# Patient Record
Sex: MALE | Race: WHITE | NOT HISPANIC OR LATINO | Employment: OTHER | URBAN - METROPOLITAN AREA
[De-identification: names, ages, dates, MRNs, and addresses within clinical notes are randomized per-mention and may not be internally consistent; named-entity substitution may affect disease eponyms.]

---

## 2017-07-07 ENCOUNTER — RESOLUTE PROFESSIONAL BILLING HOSPITAL PROF FEE (OUTPATIENT)
Dept: HOSPITALIST | Facility: MEDICAL CENTER | Age: 62
End: 2017-07-07
Payer: MEDICARE

## 2017-07-07 ENCOUNTER — APPOINTMENT (OUTPATIENT)
Dept: RADIOLOGY | Facility: MEDICAL CENTER | Age: 62
End: 2017-07-07
Attending: EMERGENCY MEDICINE
Payer: MEDICARE

## 2017-07-07 ENCOUNTER — APPOINTMENT (OUTPATIENT)
Dept: RADIOLOGY | Facility: MEDICAL CENTER | Age: 62
End: 2017-07-07
Attending: INTERNAL MEDICINE
Payer: MEDICARE

## 2017-07-07 ENCOUNTER — HOSPITAL ENCOUNTER (OUTPATIENT)
Facility: MEDICAL CENTER | Age: 62
End: 2017-07-08
Attending: EMERGENCY MEDICINE | Admitting: INTERNAL MEDICINE
Payer: MEDICARE

## 2017-07-07 DIAGNOSIS — R07.9 CHEST PAIN, UNSPECIFIED TYPE: ICD-10-CM

## 2017-07-07 DIAGNOSIS — J44.1 ACUTE EXACERBATION OF CHRONIC OBSTRUCTIVE PULMONARY DISEASE (COPD) (HCC): ICD-10-CM

## 2017-07-07 LAB
ALBUMIN SERPL BCP-MCNC: 4.1 G/DL (ref 3.2–4.9)
ALBUMIN/GLOB SERPL: 1.2 G/DL
ALP SERPL-CCNC: 103 U/L (ref 30–99)
ALT SERPL-CCNC: 9 U/L (ref 2–50)
ANION GAP SERPL CALC-SCNC: 8 MMOL/L (ref 0–11.9)
AST SERPL-CCNC: 15 U/L (ref 12–45)
BASOPHILS # BLD AUTO: 1.2 % (ref 0–1.8)
BASOPHILS # BLD: 0.11 K/UL (ref 0–0.12)
BILIRUB SERPL-MCNC: 0.6 MG/DL (ref 0.1–1.5)
BUN SERPL-MCNC: 21 MG/DL (ref 8–22)
CALCIUM SERPL-MCNC: 9.5 MG/DL (ref 8.5–10.5)
CHLORIDE SERPL-SCNC: 104 MMOL/L (ref 96–112)
CO2 SERPL-SCNC: 24 MMOL/L (ref 20–33)
CREAT SERPL-MCNC: 1 MG/DL (ref 0.5–1.4)
DEPRECATED D DIMER PPP IA-ACNC: 297 NG/ML(D-DU)
EKG IMPRESSION: NORMAL
EOSINOPHIL # BLD AUTO: 0.29 K/UL (ref 0–0.51)
EOSINOPHIL NFR BLD: 3.2 % (ref 0–6.9)
ERYTHROCYTE [DISTWIDTH] IN BLOOD BY AUTOMATED COUNT: 42.2 FL (ref 35.9–50)
GFR SERPL CREATININE-BSD FRML MDRD: >60 ML/MIN/1.73 M 2
GLOBULIN SER CALC-MCNC: 3.4 G/DL (ref 1.9–3.5)
GLUCOSE SERPL-MCNC: 91 MG/DL (ref 65–99)
HCT VFR BLD AUTO: 48 % (ref 42–52)
HGB BLD-MCNC: 16.4 G/DL (ref 14–18)
IMM GRANULOCYTES # BLD AUTO: 0.02 K/UL (ref 0–0.11)
IMM GRANULOCYTES NFR BLD AUTO: 0.2 % (ref 0–0.9)
LYMPHOCYTES # BLD AUTO: 2.55 K/UL (ref 1–4.8)
LYMPHOCYTES NFR BLD: 28.5 % (ref 22–41)
MCH RBC QN AUTO: 30.2 PG (ref 27–33)
MCHC RBC AUTO-ENTMCNC: 34.2 G/DL (ref 33.7–35.3)
MCV RBC AUTO: 88.4 FL (ref 81.4–97.8)
MONOCYTES # BLD AUTO: 0.69 K/UL (ref 0–0.85)
MONOCYTES NFR BLD AUTO: 7.7 % (ref 0–13.4)
NEUTROPHILS # BLD AUTO: 5.29 K/UL (ref 1.82–7.42)
NEUTROPHILS NFR BLD: 59.2 % (ref 44–72)
NRBC # BLD AUTO: 0 K/UL
NRBC BLD AUTO-RTO: 0 /100 WBC
PLATELET # BLD AUTO: 246 K/UL (ref 164–446)
PMV BLD AUTO: 10.2 FL (ref 9–12.9)
POTASSIUM SERPL-SCNC: 3.8 MMOL/L (ref 3.6–5.5)
PROT SERPL-MCNC: 7.5 G/DL (ref 6–8.2)
RBC # BLD AUTO: 5.43 M/UL (ref 4.7–6.1)
SODIUM SERPL-SCNC: 136 MMOL/L (ref 135–145)
TROPONIN I SERPL-MCNC: <0.01 NG/ML (ref 0–0.04)
WBC # BLD AUTO: 9 K/UL (ref 4.8–10.8)

## 2017-07-07 PROCEDURE — 71275 CT ANGIOGRAPHY CHEST: CPT

## 2017-07-07 PROCEDURE — 85379 FIBRIN DEGRADATION QUANT: CPT

## 2017-07-07 PROCEDURE — 80053 COMPREHEN METABOLIC PANEL: CPT

## 2017-07-07 PROCEDURE — 93005 ELECTROCARDIOGRAM TRACING: CPT

## 2017-07-07 PROCEDURE — 85025 COMPLETE CBC W/AUTO DIFF WBC: CPT

## 2017-07-07 PROCEDURE — A9270 NON-COVERED ITEM OR SERVICE: HCPCS | Performed by: INTERNAL MEDICINE

## 2017-07-07 PROCEDURE — 36415 COLL VENOUS BLD VENIPUNCTURE: CPT

## 2017-07-07 PROCEDURE — G0378 HOSPITAL OBSERVATION PER HR: HCPCS

## 2017-07-07 PROCEDURE — 93010 ELECTROCARDIOGRAM REPORT: CPT | Mod: 76 | Performed by: INTERNAL MEDICINE

## 2017-07-07 PROCEDURE — 99406 BEHAV CHNG SMOKING 3-10 MIN: CPT | Performed by: INTERNAL MEDICINE

## 2017-07-07 PROCEDURE — 99285 EMERGENCY DEPT VISIT HI MDM: CPT

## 2017-07-07 PROCEDURE — 700102 HCHG RX REV CODE 250 W/ 637 OVERRIDE(OP): Performed by: INTERNAL MEDICINE

## 2017-07-07 PROCEDURE — 304562 HCHG STAT O2 MASK/CANNULA

## 2017-07-07 PROCEDURE — 99220 PR INITIAL OBSERVATION CARE,LEVL III: CPT | Mod: 25 | Performed by: INTERNAL MEDICINE

## 2017-07-07 PROCEDURE — 71010 DX-CHEST-PORTABLE (1 VIEW): CPT

## 2017-07-07 PROCEDURE — 84484 ASSAY OF TROPONIN QUANT: CPT

## 2017-07-07 PROCEDURE — 93005 ELECTROCARDIOGRAM TRACING: CPT | Performed by: INTERNAL MEDICINE

## 2017-07-07 PROCEDURE — 700101 HCHG RX REV CODE 250: Performed by: EMERGENCY MEDICINE

## 2017-07-07 PROCEDURE — 94640 AIRWAY INHALATION TREATMENT: CPT

## 2017-07-07 RX ORDER — HYDROCODONE BITARTRATE AND ACETAMINOPHEN 7.5; 325 MG/1; MG/1
1 TABLET ORAL EVERY 6 HOURS PRN
COMMUNITY
End: 2019-03-03

## 2017-07-07 RX ORDER — LEVOFLOXACIN 750 MG/1
750 TABLET, FILM COATED ORAL DAILY
Status: DISCONTINUED | OUTPATIENT
Start: 2017-07-07 | End: 2017-07-08 | Stop reason: HOSPADM

## 2017-07-07 RX ORDER — ASPIRIN 325 MG
325 TABLET ORAL ONCE
COMMUNITY
End: 2019-03-03

## 2017-07-07 RX ORDER — AMOXICILLIN 250 MG
2 CAPSULE ORAL 2 TIMES DAILY
Status: DISCONTINUED | OUTPATIENT
Start: 2017-07-07 | End: 2017-07-08 | Stop reason: HOSPADM

## 2017-07-07 RX ORDER — POLYETHYLENE GLYCOL 3350 17 G/17G
1 POWDER, FOR SOLUTION ORAL
Status: DISCONTINUED | OUTPATIENT
Start: 2017-07-07 | End: 2017-07-08 | Stop reason: HOSPADM

## 2017-07-07 RX ORDER — BUDESONIDE 0.5 MG/2ML
0.5 INHALANT ORAL
Status: DISCONTINUED | OUTPATIENT
Start: 2017-07-07 | End: 2017-07-08 | Stop reason: HOSPADM

## 2017-07-07 RX ORDER — BISACODYL 10 MG
10 SUPPOSITORY, RECTAL RECTAL
Status: DISCONTINUED | OUTPATIENT
Start: 2017-07-07 | End: 2017-07-08 | Stop reason: HOSPADM

## 2017-07-07 RX ORDER — IPRATROPIUM BROMIDE AND ALBUTEROL SULFATE 2.5; .5 MG/3ML; MG/3ML
3 SOLUTION RESPIRATORY (INHALATION)
Status: DISCONTINUED | OUTPATIENT
Start: 2017-07-07 | End: 2017-07-08 | Stop reason: HOSPADM

## 2017-07-07 RX ORDER — BUDESONIDE 0.5 MG/2ML
0.5 INHALANT ORAL
Status: DISCONTINUED | OUTPATIENT
Start: 2017-07-07 | End: 2017-07-07

## 2017-07-07 RX ORDER — IBUPROFEN 800 MG/1
800 TABLET ORAL EVERY 8 HOURS PRN
COMMUNITY

## 2017-07-07 RX ADMIN — LEVOFLOXACIN 750 MG: 750 TABLET, FILM COATED ORAL at 14:59

## 2017-07-07 RX ADMIN — IPRATROPIUM BROMIDE AND ALBUTEROL SULFATE 3 ML: .5; 3 SOLUTION RESPIRATORY (INHALATION) at 13:41

## 2017-07-07 ASSESSMENT — LIFESTYLE VARIABLES
EVER_SMOKED: YES
EVER_SMOKED: YES
ALCOHOL_USE: NO
DO YOU DRINK ALCOHOL: NO

## 2017-07-07 ASSESSMENT — ENCOUNTER SYMPTOMS
ABDOMINAL PAIN: 0
DIAPHORESIS: 1
NAUSEA: 0
VOMITING: 0
DIARRHEA: 0

## 2017-07-07 ASSESSMENT — PAIN SCALES - GENERAL
PAINLEVEL_OUTOF10: 0
PAINLEVEL_OUTOF10: 5
PAINLEVEL_OUTOF10: 0
PAINLEVEL_OUTOF10: 0

## 2017-07-07 ASSESSMENT — COPD QUESTIONNAIRES
DURING THE PAST 4 WEEKS HOW MUCH DID YOU FEEL SHORT OF BREATH: SOME OF THE TIME
HAVE YOU SMOKED AT LEAST 100 CIGARETTES IN YOUR ENTIRE LIFE: YES
COPD SCREENING SCORE: 6
DO YOU EVER COUGH UP ANY MUCUS OR PHLEGM?: YES, A FEW DAYS A WEEK OR MONTH

## 2017-07-07 NOTE — ED PROVIDER NOTES
ED Provider Note    Scribed for Buzz Smyth M.D. by Jose Juan Bueno. 7/7/2017, 1:24 PM.    Primary care provider: Pcp Pt States None  Means of arrival: walk-in  History obtained from: patient  History limited by: none    CHIEF COMPLAINT  Chief Complaint   Patient presents with   • Chest Pain     left sided, radiates thru to left shoulder blade, onset yesterday, intermittent, now worse and more frequent since 3am       HPI  Neeta Warren is a 61 y.o. male smoker who presents to the Emergency Department complaining of chest pain, located to the left side of his chest, which radiates through to the middle of his shoulder blades, onset 10-11 hours ago. Patient adds he felt some small pains yesterday which he thought was indegestion. He has not had an incident like this before. He confirms sweating as he was outside and hot. Denies nausea, vomiting, diarrhea, melnea abdominal pain, bilateral leg pain or swelling. He is on pain management medication secondary to having 17 surgeries on his wrist, having injured it while he was climbing a ladder, when his tendons popped, giving out, causing him to fall. His bilateral arm pain in constant and baseline today. Patient is allergic to NSAIDS.     History: Patient presents with left sided chest pain. He is a current everyday smoker.     REVIEW OF SYSTEMS  Review of Systems   Constitutional: Positive for diaphoresis.   Cardiovascular: Positive for chest pain.   Gastrointestinal: Negative for nausea, vomiting, abdominal pain, diarrhea and melena.   Musculoskeletal:        Negative bilateral leg pain and leg swelling.      All other systems reviewed and are negative.  C.     PAST MEDICAL HISTORY   has a past medical history of Heart murmur and Kidney disease.    SURGICAL HISTORY   has past surgical history that includes hand surgery.    SOCIAL HISTORY  Social History   Substance Use Topics   • Smoking status: Current Every Day Smoker -- 1.00 packs/day     Types: Cigarettes   •  "Smokeless tobacco: Never Used   • Alcohol Use: No      History   Drug Use No       FAMILY HISTORY  Family History   Problem Relation Age of Onset   • Diabetes Father    • Heart Disease Maternal Grandmother    • Heart Disease Maternal Grandfather        CURRENT MEDICATIONS  Home Medications     **Home medications have not yet been reviewed for this encounter**          ALLERGIES  Allergies   Allergen Reactions   • Bee    • Celebrex [Celecoxib]    • Cortisone    NSAIDS    PHYSICAL EXAM  VITAL SIGNS: /96 mmHg  Pulse 99  Temp(Src) 36.7 °C (98.1 °F)  Resp 16  Ht 1.676 m (5' 6\")  Wt 92 kg (202 lb 13.2 oz)  BMI 32.75 kg/m2  SpO2 95%    Constitutional:   No acute distress  HENT:  Moist mucous membranes  Eyes:  No conjunctivitis or icterus  Neck:  trachea is midline, no palpable thyroid  Lymphatic:  No cervical lymphadenopathy  Cardiovascular:  Tachycardic. Regular rhythm, no murmurs  Thorax & Lungs: Wheezes throughout. no rhonchi  Abdomen:  Soft, Non-tender  Skin:.  no rash  Back:  Non-tender, no CVA tenderness  Extremities:   no edema  Vascular:  symmetric radial pulse  Neurologic:  Normal gross motor    LABS  Labs Reviewed   COMP METABOLIC PANEL - Abnormal; Notable for the following:     Alkaline Phosphatase 103 (*)     All other components within normal limits   D-DIMER - Abnormal; Notable for the following:     D-Dimer Screen 297 (*)     All other components within normal limits   CBC WITH DIFFERENTIAL   TROPONIN   ESTIMATED GFR     All labs reviewed by me.    EKG Interpretation  Interpreted by me  Normal Sinus Rhythm. Rate 90   with normal corrected QT intervals.   QRS left axis  Otherwise normal. Unchanged.     RADIOLOGY  DX-CHEST-PORTABLE (1 VIEW)   Final Result      1.  There is a small left pleural effusion versus pleural thickening.   2.  There is left lower lobe atelectasis.        The radiologist's interpretation of all radiological studies have been reviewed by me.    COURSE & MEDICAL DECISION " MAKING  Pertinent Labs & Imaging studies reviewed. (See chart for details)    1:24 PM - Patient seen and examined at bedside. Patient will be treated with Duoneb. Ordered DX chest, CBC with differential, CMP, Troponin, EKG to evaluate his symptoms. The differential diagnoses include but are not limited to: Chest pain rule out myocardial infarction. Will perform D-dimer for screening.  Informed patient he may have emphysema secondary to his smoking history.     2:20 PM - I discussed the patient's case and the above findings with Dr. Thomas (Hospitalist) who agrees to admit the patient.           DISPOSITION:  Patient will be admitted to Dr. Thomas in guarded condition.      FINAL IMPRESSION  1. Chest pain, unspecified type    2. Acute exacerbation of chronic obstructive pulmonary disease (COPD) (CMS-[facility])          Jose Juan CARTER (Scribe), am scribing for, and in the presence of, Buzz Smyth M.D..    Electronically signed by: Jose Juan Bueno (Scribe), 7/7/2017    Buzz CARTER M.D. personally performed the services described in this documentation, as scribed by Jose Juan Bueno in my presence, and it is both accurate and complete.    The note accurately reflects work and decisions made by me.  Buzz Smyth  7/7/2017  5:59 PM

## 2017-07-07 NOTE — H&P
HOSPITAL MEDICINE ADMISSION NOTE    Date of Service: 2017   Name: Neeta Warren   : 1955  MRN: [id number]  CSN: [id number]  Primary Care Physician: Pcp Pt States None    Chief Complaint  Chief Complaint   Patient presents with   • Chest Pain     left sided, radiates thru to left shoulder blade, onset yesterday, intermittent, now worse and more frequent since 3am       History of Present Illness  Neeta Warren is a 61 y.o. male who complained of chest pain. He described it on L side rad to sternum then to R, woke him up at 3am, 6-7/10 intensity, sharp, comes and goes. Not pleuritic or reproducible. No alleviating or aggravating factors. Risk factors include smoking. No yuki cardiac disease though his mom had heart attack at an elderly age. He has a cortisone allergy. He has no cardiac history. He has no formal diagnosis of COPD.  At the ED, he is afebrile, hemodynamically stable. EKG sinus, trop x 1 negative. CXR small L effusion atelectasis.   When I saw him at ED he was in no acute distress. He was wheezing but moving air well bilaterally. No edema.    Home Medications  No current facility-administered medications on file prior to encounter.     Current Outpatient Prescriptions on File Prior to Encounter   Medication Sig Dispense Refill   • hydrocodone-acetaminophen (LORTAB 7.5) 7.5-500 MG TABS Take 1 Tab by mouth every 6 hours as needed.         Allergies  Bee; Celebrex; and Cortisone    Past Medical and Surgical History  Past Medical History   Diagnosis Date   • Heart murmur    • Kidney disease      Past Surgical History   Procedure Laterality Date   • Hand surgery         Family History  Family History   Problem Relation Age of Onset   • Diabetes Father    • Heart Disease Maternal Grandmother    • Heart Disease Maternal Grandfather        Social History  Social History     Social History   • Marital Status:      Spouse Name: N/A   • Number of Children: N/A   • Years of Education: N/A  "    Occupational History   • Not on file.     Social History Main Topics   • Smoking status: Current Every Day Smoker -- 1.00 packs/day     Types: Cigarettes   • Smokeless tobacco: Never Used   • Alcohol Use: No   • Drug Use: No   • Sexual Activity: Not on file     Other Topics Concern   • Not on file     Social History Narrative   • No narrative on file       Review of Systems  ROS    General. No fevers or chills.  Eyes: No vision loss.  ENT: No nasal congestion, epistaxis. No hearing loss.  Respiratory: No shortness of breath, productive coughing, wheezing. No hemoptysis.  Cardiovascular: Chest pain. No palpitations, murmur or claudications. No orthopnea, exertional chest pain. No dyspnea on exertion.  Gastrointestinal: No nausea, vomiting, diarrhea, constipation. No abdominal pain.  Genitourinary: No dysuria. No incontinence.  Musoskeletal: No falls, myalgias or arthralgias.  Integumentary: No new rashes  Heme: No obvious lymphadenopathy  Neurologic: No headaches, loss of consciousness or seizure activity.  Psychiatric: Stable mood. Not currently anxious or depressed.    Physical Exam  Filed Vitals:    07/07/17 1237 07/07/17 1248 07/07/17 1343   BP: 155/96     Pulse: 99  86   Temp: 36.7 °C (98.1 °F)     Resp: 16  16   Height: 1.676 m (5' 6\")     Weight:  92 kg (202 lb 13.2 oz)    SpO2: 95%  97%           No intake or output data in the 24 hours ending 07/07/17 1435  Physical Exam    General/Constitutional: No acute distress.   Head: Normocephalic, atraumatic  ENT: Oral mucosa is moist. No obvious pharyngeal exudates  Eyes: Pink conjunctiva, no scleral icterus  Neck: Supple, no lymphadenopathy  Cardiovascular: Normal rate and regular rhythm. S1,2 noted. No murmurs, gallops or rubs.  Pulmonary: Wheezing but moving air well bilaterally.  Abdominal: Soft, nontender, not distended, bowel sounds normoactive. No guarding or peritoneal signs.  Musculoskeletal: No tenderness to palpation of chest wall.  Neurologic: Alert " and oriented. Grossly nonfocal, moving all extremities.  Genitourinary: No gross hematuria  Skin: No obvious rash.  Psychiatric: Pleasant, cooperative.  Vitals Reviewed  Labs Reviewed  Imaging reviewed  Nursing notes reviewed    Labs  Lab Results   Component Value Date/Time    WBC 9.0 07/07/2017 01:30 PM    RBC 5.43 07/07/2017 01:30 PM    HEMOGLOBIN 16.4 07/07/2017 01:30 PM    HEMATOCRIT 48.0 07/07/2017 01:30 PM    MCV 88.4 07/07/2017 01:30 PM    MCH 30.2 07/07/2017 01:30 PM    MCHC 34.2 07/07/2017 01:30 PM    MPV 10.2 07/07/2017 01:30 PM    NEUTROPHILS-POLYS 59.20 07/07/2017 01:30 PM    LYMPHOCYTES 28.50 07/07/2017 01:30 PM    MONOCYTES 7.70 07/07/2017 01:30 PM    EOSINOPHILS 3.20 07/07/2017 01:30 PM    BASOPHILS 1.20 07/07/2017 01:30 PM      Lab Results   Component Value Date/Time    SODIUM 136 07/07/2017 01:30 PM    POTASSIUM 3.8 07/07/2017 01:30 PM    CHLORIDE 104 07/07/2017 01:30 PM    CO2 24 07/07/2017 01:30 PM    GLUCOSE 91 07/07/2017 01:30 PM    BUN 21 07/07/2017 01:30 PM    CREATININE 1.00 07/07/2017 01:30 PM      No results found for: PROTHROMBTM, INR     Imaging  Dx-chest-portable (1 View)    7/7/2017 7/7/2017 2:00 PM HISTORY/REASON FOR EXAM:  Left-sided chest pain for one day after breathing treatment today. TECHNIQUE/EXAM DESCRIPTION AND NUMBER OF VIEWS: Single portable view of the chest. COMPARISON: 1/19/2012 FINDINGS: The mediastinal and cardiac silhouette is unremarkable. The pulmonary vascularity is within normal limits. The lung parenchyma demonstrates minimal hazy left lower lobe opacity. There is blunting of the left costophrenic angle. There is no visible pneumothorax. There is a scoliosis.     7/7/2017  1.  There is a small left pleural effusion versus pleural thickening. 2.  There is left lower lobe atelectasis.      Assessment and Plan    Chest pain. Put on telemtry, trend troponins, ASA, lipid panel, echo. Ddimer pending. Do CTA PE if elevated.    COPD exacerbation. No formal diagnosis. Has  cortisone allergy. Will do breathing treatments, O2 and resp per protocol. In the outpatient, can benefit from sleep study and PFTs.    Tobacco dependence. I counseled him on smoking and effects on worsening COPD. I spent 7 minutes. He is tryin to quit.    Pharmacologic DVT prophylaxis.    I spent 72 minutes evaluating Neeta Warren, reviewing the chart, vitals, labs and imaging, discussing the case with ED physician, medication reconciliation placing orders and enacting the plan above.    CC Pcp Pt States None

## 2017-07-07 NOTE — IP AVS SNAPSHOT
" Home Care Instructions                                                                                                                  Name:Neeta Warren  Medical Record Number:[id number]  CSN: [id number]    YOB: 1955   Age: 61 y.o.  Sex: male  HT:1.676 m (5' 6\") WT: 92.5 kg (203 lb 14.8 oz)          Admit Date: 7/7/2017     Discharge Date:   Today's Date: 7/8/2017  Attending Doctor:  Farhan Mcginnis M.D.                  Allergies:  Bee; Celebrex; and Cortisone            Discharge Instructions       Discharge Instructions    Discharged to home by car with relative. Discharged via wheelchair, hospital escort: Yes.  Special equipment needed: Not Applicable    Be sure to schedule a follow-up appointment with your primary care doctor or any specialists as instructed.     Discharge Plan:   Diet Plan: Discussed  Activity Level: Discussed  Smoking Cessation Offered: Patient Refused  Confirmed Follow up Appointment: Patient to Call and Schedule Appointment  Confirmed Symptoms Management: Discussed  Medication Reconciliation Updated: Yes  Influenza Vaccine Indication: Patient Refuses    I understand that a diet low in cholesterol, fat, and sodium is recommended for good health. Unless I have been given specific instructions below for another diet, I accept this instruction as my diet prescription.   Other diet: Cardiac    Special Instructions: None    · Is patient discharged on Warfarin / Coumadin?   No     · Is patient Post Blood Transfusion?  No    Depression / Suicide Risk    As you are discharged from [facility], it is important to learn how to keep safe from harming yourself.    Recognize the warning signs:  · Abrupt changes in personality, positive or negative- including increase in energy   · Giving away possessions  · Change in eating patterns- significant weight changes-  positive or negative  · Change in sleeping patterns- unable to sleep or sleeping all the time   · Unwillingness or " inability to communicate  · Depression  · Unusual sadness, discouragement and loneliness  · Talk of wanting to die  · Neglect of personal appearance   · Rebelliousness- reckless behavior  · Withdrawal from people/activities they love  · Confusion- inability to concentrate     If you or a loved one observes any of these behaviors or has concerns about self-harm, here's what you can do:  · Talk about it- your feelings and reasons for harming yourself  · Remove any means that you might use to hurt yourself (examples: pills, rope, extension cords, firearm)  · Get professional help from the community (Mental Health, Substance Abuse, psychological counseling)  · Do not be alone:Call your Safe Contact- someone whom you trust who will be there for you.  · Call your local CRISIS HOTLINE 154-5677 or 203-110-2531  · Call your local Children's Mobile Crisis Response Team [place] (268) 936-2223 or www.[employer]  · Call the toll free National Suicide Prevention Hotlines   · National Suicide Prevention Lifeline 952-425-DLDS (9071)  · [employer] Hope Line Network 800-SUICIDE (133-8435)        Your appointments     Jul 18, 2017  8:40 AM   New Patient with Rusty Becker M.D.   [facility]    [address]  [address]  [place] [address] [phone]           Please bring Photo ID, Insurance Cards, All Medication Bottles and copies of any legal documents (such as Living Will, Power of ) If speaking a language besides English please bring an adult . Please arrive 30 minutes prior for check in and registration. You will be receiving a confirmation call a few days before your appointment from our automated call confirmation system.                 Discharge Medication Instructions:    Below are the medications your physician expects you to take upon discharge:    Review all your home medications and newly ordered medications with your doctor and/or  pharmacist. Follow medication instructions as directed by your doctor and/or pharmacist.    Please keep your medication list with you and share with your physician.               Medication List      START taking these medications        Instructions    Morning Afternoon Evening Bedtime    albuterol 108 (90 BASE) MCG/ACT Aers inhalation aerosol        Inhale 2 Puffs by mouth every 6 hours as needed for Shortness of Breath.   Dose:  2 Puff                        levofloxacin 750 MG tablet   Last time this was given:  750 mg on 7/8/2017  7:47 AM   Commonly known as:  LEVAQUIN        Take 1 Tab by mouth every day for 5 days.   Dose:  750 mg                          CONTINUE taking these medications        Instructions    Morning Afternoon Evening Bedtime    aspirin 325 MG Tabs   Commonly known as:  ASA        Take 650 mg by mouth Once.   Dose:  650 mg                        hydrocodone-acetaminophen 7.5-325 MG per tablet   Commonly known as:  NORCO        Take 1 Tab by mouth every 6 hours as needed.   Dose:  1 Tab                        ibuprofen 800 MG Tabs   Commonly known as:  MOTRIN        Take 800 mg by mouth every 8 hours as needed.   Dose:  800 mg                             Where to Get Your Medications      These medications were sent to [facility] [address] [place] [address] [place] - [address] [place], [place] [address]Y  [address] [facility] [address]     Phone:  [phone]    - albuterol 108 (90 BASE) MCG/ACT Aers inhalation aerosol  - levofloxacin 750 MG tablet            Instructions           Diet / Nutrition:    Follow any diet instructions given to you by your doctor or the dietician, including how much salt (sodium) you are allowed each day.    If you are overweight, talk to your doctor about a weight reduction plan.    Activity:    Remain physically active following your doctor's instructions about exercise and activity.    Rest often.     Any time you become even a little tired or short of breath, SIT DOWN and  rest.    Worsening Symptoms:    Report any of the following signs and symptoms to the doctor's office immediately:    *Pain of jaw, arm, or neck  *Chest pain not relieved by medication                               *Dizziness or loss of consciousness  *Difficulty breathing even when at rest   *More tired than usual                                       *Bleeding drainage or swelling of surgical site  *Swelling of feet, ankles, legs or stomach                 *Fever (>100ºF)  *Pink or blood tinged sputum  *Weight gain (3lbs/day or 5lbs /week)           *Shock from internal defibrillator (if applicable)  *Palpitations or irregular heartbeats                *Cool and/or numb extremities    Stroke Awareness    Common Risk Factors for Stroke include:    Age  Atrial Fibrillation  Carotid Artery Stenosis  Diabetes Mellitus  Excessive alcohol consumption  High blood pressure  Overweight   Physical inactivity  Smoking    Warning signs and symptoms of a stroke include:    *Sudden numbness or weakness of the face, arm or leg (especially on one side of the body).  *Sudden confusion, trouble speaking or understanding.  *Sudden trouble seeing in one or both eyes.  *Sudden trouble walking, dizziness, loss of balance or coordination.Sudden severe headache with no known cause.    It is very important to get treatment quickly when a stroke occurs. If you experience any of the above warning signs, call 911 immediately.                   Disclaimer         Quit Smoking / Tobacco Use:    I understand the use of any tobacco products increases my chance of suffering from future heart disease or stroke and could cause other illnesses which may shorten my life. Quitting the use of tobacco products is the single most important thing I can do to improve my health. For further information on smoking / tobacco cessation call a Toll Free Quit Line at 1-891.955.9769 (*National Cancer [place]) or 1-138.405.1289 (American Lung Association) or you  can access the web based program at www.lungusa.org.    [place] Tobacco Users Help Line:  (851) 783-2234       Toll Free: 1-706.444.2375  Quit Tobacco Program [facility] (890)829-8913    Crisis Hotline:    [place] Crisis Hotline:  4-224-BSOWPSX or 1-738.498.3014    [place] Crisis Hotline:    1-236.380.5102 or 494-205-8148    Discharge Survey:   Thank you for choosing [facility]. We hope we did everything we could to make your hospital stay a pleasant one. You may be receiving a phone survey and we would appreciate your time and participation in answering the questions. Your input is very valuable to us in our efforts to improve our service to our patients and their families.        My signature on this form indicates that:    1. I have reviewed and understand the above information.  2. My questions regarding this information have been answered to my satisfaction.  3. I have formulated a plan with my discharge nurse to obtain my prescribed medications for home.                  Disclaimer         __________________________________                     __________       ________                       Patient Signature                                                 Date                    Time

## 2017-07-07 NOTE — ED NOTES
Pt amb to triage.  Chief Complaint   Patient presents with   • Chest Pain     left sided, radiates thru to left shoulder blade, onset yesterday, intermittent, now worse and more frequent since 3am     EKG complete.  PTA pt took ASA 650mg PO.

## 2017-07-08 ENCOUNTER — PATIENT OUTREACH (OUTPATIENT)
Dept: HEALTH INFORMATION MANAGEMENT | Facility: OTHER | Age: 62
End: 2017-07-08

## 2017-07-08 VITALS
WEIGHT: 203.93 LBS | TEMPERATURE: 96.2 F | RESPIRATION RATE: 18 BRPM | HEART RATE: 107 BPM | OXYGEN SATURATION: 91 % | HEIGHT: 66 IN | BODY MASS INDEX: 32.77 KG/M2 | DIASTOLIC BLOOD PRESSURE: 85 MMHG | SYSTOLIC BLOOD PRESSURE: 129 MMHG

## 2017-07-08 PROBLEM — F17.200 TOBACCO DEPENDENCE: Chronic | Status: ACTIVE | Noted: 2017-07-08

## 2017-07-08 LAB
LV EJECT FRACT  99904: 60
LV EJECT FRACT MOD 2C 99903: 57.2
LV EJECT FRACT MOD 4C 99902: 48.42
LV EJECT FRACT MOD BP 99901: 53.7

## 2017-07-08 PROCEDURE — A9270 NON-COVERED ITEM OR SERVICE: HCPCS | Performed by: INTERNAL MEDICINE

## 2017-07-08 PROCEDURE — 700111 HCHG RX REV CODE 636 W/ 250 OVERRIDE (IP): Performed by: INTERNAL MEDICINE

## 2017-07-08 PROCEDURE — 99217 PR OBSERVATION CARE DISCHARGE: CPT | Performed by: HOSPITALIST

## 2017-07-08 PROCEDURE — 93017 CV STRESS TEST TRACING ONLY: CPT

## 2017-07-08 PROCEDURE — 93018 CV STRESS TEST I&R ONLY: CPT | Performed by: INTERNAL MEDICINE

## 2017-07-08 PROCEDURE — 700102 HCHG RX REV CODE 250 W/ 637 OVERRIDE(OP): Performed by: INTERNAL MEDICINE

## 2017-07-08 PROCEDURE — 93350 STRESS TTE ONLY: CPT

## 2017-07-08 PROCEDURE — 93350 STRESS TTE ONLY: CPT | Mod: 26 | Performed by: INTERNAL MEDICINE

## 2017-07-08 PROCEDURE — G0378 HOSPITAL OBSERVATION PER HR: HCPCS

## 2017-07-08 RX ORDER — LEVOFLOXACIN 750 MG/1
750 TABLET, FILM COATED ORAL DAILY
Qty: 5 TAB | Refills: 0 | Status: SHIPPED | OUTPATIENT
Start: 2017-07-08 | End: 2017-07-13

## 2017-07-08 RX ORDER — ALBUTEROL SULFATE 90 UG/1
2 AEROSOL, METERED RESPIRATORY (INHALATION) EVERY 6 HOURS PRN
Qty: 8.5 G | Refills: 2 | Status: SHIPPED | OUTPATIENT
Start: 2017-07-08 | End: 2019-03-03

## 2017-07-08 RX ADMIN — LEVOFLOXACIN 750 MG: 750 TABLET, FILM COATED ORAL at 07:47

## 2017-07-08 ASSESSMENT — PAIN SCALES - GENERAL: PAINLEVEL_OUTOF10: 6

## 2017-07-08 ASSESSMENT — LIFESTYLE VARIABLES: EVER_SMOKED: YES

## 2017-07-08 NOTE — PROGRESS NOTES
Received report from SOLANGE Arita. Pt resting comfortably in bed w/ tele monitor in place. No needs identified at this time.

## 2017-07-08 NOTE — PROGRESS NOTES
Pt back from CT w/ transport. Pt placed back on tele. Per transport PIV blew when placing contrast.

## 2017-07-08 NOTE — PROGRESS NOTES
Report received, assumed patient care.  Pt A&OX4.  Assessment completed.  Call light within reach, personal belongings available, bed in lowest position, pt calling for assistance.  Pt reports no pain at this time (pain comes and goes quickly).  Communication board updated, POC discussed.  Monitors applied, VSS.  No additional needs at this time.

## 2017-07-08 NOTE — PROGRESS NOTES
Pt had 2 sips of Dr. Pepper and chocolate pudding that came w/ pt dinner. Pt states he has not had nay of that since 1920. Pt educated not to eat or drink anything else w/ caffeine such as soda, coffee, chocolate, or tea. Pt verbalizes understanding. Discussed case w/ SOLANGE Jaquez and MONIQUE MaynardG tech as well as look at inpatient imaging prep which states no caffeine for 12 hours prior to exam pt made aware. Nuc med to be notified at 0600 and day shift RN to notify MD in the morning.

## 2017-07-08 NOTE — CARE PLAN
Problem: Communication  Goal: The ability to communicate needs accurately and effectively will improve  Outcome: PROGRESSING AS EXPECTED  Pt educated on medications and tests scheduled for pt. Pt asks appropriate questions.    Problem: Safety  Goal: Will remain free from injury  Outcome: PROGRESSING AS EXPECTED  Pt has steady gait and verbalizes he will call if he needs help.

## 2017-07-08 NOTE — DISCHARGE INSTRUCTIONS
Discharge Instructions    Discharged to home by car with relative. Discharged via wheelchair, hospital escort: Yes.  Special equipment needed: Not Applicable    Be sure to schedule a follow-up appointment with your primary care doctor or any specialists as instructed.     Discharge Plan:   Diet Plan: Discussed  Activity Level: Discussed  Smoking Cessation Offered: Patient Refused  Confirmed Follow up Appointment: Patient to Call and Schedule Appointment  Confirmed Symptoms Management: Discussed  Medication Reconciliation Updated: Yes  Influenza Vaccine Indication: Patient Refuses    I understand that a diet low in cholesterol, fat, and sodium is recommended for good health. Unless I have been given specific instructions below for another diet, I accept this instruction as my diet prescription.   Other diet: Cardiac    Special Instructions: None    · Is patient discharged on Warfarin / Coumadin?   No     · Is patient Post Blood Transfusion?  No    Depression / Suicide Risk    As you are discharged from this [facility], it is important to learn how to keep safe from harming yourself.    Recognize the warning signs:  · Abrupt changes in personality, positive or negative- including increase in energy   · Giving away possessions  · Change in eating patterns- significant weight changes-  positive or negative  · Change in sleeping patterns- unable to sleep or sleeping all the time   · Unwillingness or inability to communicate  · Depression  · Unusual sadness, discouragement and loneliness  · Talk of wanting to die  · Neglect of personal appearance   · Rebelliousness- reckless behavior  · Withdrawal from people/activities they love  · Confusion- inability to concentrate     If you or a loved one observes any of these behaviors or has concerns about self-harm, here's what you can do:  · Talk about it- your feelings and reasons for harming yourself  · Remove any means that you might use to hurt yourself (examples:  pills, rope, extension cords, firearm)  · Get professional help from the community (Mental Health, Substance Abuse, psychological counseling)  · Do not be alone:Call your Safe Contact- someone whom you trust who will be there for you.  · Call your local CRISIS HOTLINE 453-9237 or 366-098-1180  · Call your local Children's Mobile Crisis Response Team [place] (006) 908-7518 or www.Spotted  · Call the toll free National Suicide Prevention Hotlines   · National Suicide Prevention Lifeline 287-431-EDHX (4184)  · National Hope Line Network 800-SUICIDE (094-9245)

## 2017-07-08 NOTE — PROGRESS NOTES
Assessment complete. A&Ox4. C/o pain 0/10 w/ stabbing pain per pt that lasts 10-15 sec q5 min. Will be medicated per MAR prn. Discussed POC w/ pt. Educated on medications per MAR. Discussed the importance of calling for needs and if pt has changes or increases in CP. Pt verbalizes understanding. Call light in reach. Bed in lowest position. No other needs identified. Will continue hourly rounding.

## 2017-07-08 NOTE — DISCHARGE SUMMARY
CHIEF COMPLAINT ON ADMISSION  Chief Complaint   Patient presents with   • Chest Pain     left sided, radiates thru to left shoulder blade, onset yesterday, intermittent, now worse and more frequent since 3am       CODE STATUS  Full Code    HPI & HOSPITAL COURSE  This is a 61 y.o. male here with chest pain. He described it on L side rad to sternum then to R, woke him up at 3am, 6-7/10 intensity, sharp, comes and goes. Not pleuritic or reproducible. No alleviating or aggravating factors. Risk factors include smoking and he was counseled on cessation >3min. No yuki cardiac disease though his mom had heart attack at an elderly age. He has a cortisone allergy. He has no cardiac history. He has no formal diagnosis of COPD. He is afebrile, hemodynamically stable. EKG sinus, troponins negative. CXR small L effusion and atelectasis. He was wheezing but moving air well bilaterally. He was saturating well on RA at discharge. He improved with Levaquin, RT nebulizer and a stress echo was done and negative. He will be given a new PCP to f/u with and we recommended getting pulmonary referral for Lung Function tests.     Therefore, he is discharged in good and stable condition with close outpatient follow-up.    SPECIFIC OUTPATIENT FOLLOW-UP  PCP within 7-10days    DISCHARGE PROBLEM LIST  Active Problems:    COPD exacerbation (CMS-[facility]) POA: Unknown    Chest pain POA: Unknown  Resolved Problems:    * No resolved hospital problems. *      FOLLOW UP  No future appointments.  No follow-up provider specified.    MEDICATIONS ON DISCHARGE   Neeta Warren   Home Medication Instructions JAISON:[id number]    Printed on:07/08/17 5293   Medication Information                      aspirin (ASA) 325 MG Tab  Take 650 mg by mouth Once.             hydrocodone-acetaminophen (NORCO) 7.5-325 MG per tablet  Take 1 Tab by mouth every 6 hours as needed.             ibuprofen (MOTRIN) 800 MG Tab  Take 800 mg by mouth every 8 hours as needed.                  DIET  Orders Placed This Encounter   Procedures   • Diet Order     Standing Status: Standing      Number of Occurrences: 1      Standing Expiration Date:      Order Specific Question:  Diet:     Answer:  Cardiac [6]       ACTIVITY  As tolerated.  Weight bearing as tolerated      CONSULTATIONS  None    PROCEDURES  None    LABORATORY  Lab Results   Component Value Date/Time    SODIUM 136 07/07/2017 01:30 PM    POTASSIUM 3.8 07/07/2017 01:30 PM    CHLORIDE 104 07/07/2017 01:30 PM    CO2 24 07/07/2017 01:30 PM    GLUCOSE 91 07/07/2017 01:30 PM    BUN 21 07/07/2017 01:30 PM    CREATININE 1.00 07/07/2017 01:30 PM        Lab Results   Component Value Date/Time    WBC 9.0 07/07/2017 01:30 PM    HEMOGLOBIN 16.4 07/07/2017 01:30 PM    HEMATOCRIT 48.0 07/07/2017 01:30 PM    PLATELET COUNT 246 07/07/2017 01:30 PM        Total time of the discharge process exceeds 36 minutes

## 2017-07-08 NOTE — PROGRESS NOTES
Discharge instructions, medications and follow-up reviewed with pt, pt verbalized understanding and denies questions. Discharge paperwork given to pt. PIV removed, TeleBox removed, armband removed. Pt awaiting transport.

## 2017-07-09 ENCOUNTER — PATIENT OUTREACH (OUTPATIENT)
Dept: HEALTH INFORMATION MANAGEMENT | Facility: OTHER | Age: 62
End: 2017-07-09

## 2017-07-09 LAB
EKG IMPRESSION: NORMAL
EKG IMPRESSION: NORMAL

## 2017-07-09 NOTE — PROGRESS NOTES
· Placed discharge outreach phone call to patient s/p hospital discharge 7/8/17.  Left voicemail providing my contact information and instructions to call with any questions or concerns.

## 2018-03-30 ENCOUNTER — OFFICE VISIT (OUTPATIENT)
Dept: URGENT CARE | Facility: CLINIC | Age: 63
End: 2018-03-30
Payer: MEDICARE

## 2018-03-30 VITALS
TEMPERATURE: 97.8 F | BODY MASS INDEX: 31.4 KG/M2 | WEIGHT: 212 LBS | OXYGEN SATURATION: 94 % | HEART RATE: 111 BPM | DIASTOLIC BLOOD PRESSURE: 64 MMHG | HEIGHT: 69 IN | SYSTOLIC BLOOD PRESSURE: 112 MMHG

## 2018-03-30 DIAGNOSIS — K04.7 DENTAL INFECTION: ICD-10-CM

## 2018-03-30 PROCEDURE — 99203 OFFICE O/P NEW LOW 30 MIN: CPT | Performed by: NURSE PRACTITIONER

## 2018-03-30 RX ORDER — CLINDAMYCIN HYDROCHLORIDE 300 MG/1
300 CAPSULE ORAL 3 TIMES DAILY
Qty: 21 CAP | Refills: 0 | Status: SHIPPED | OUTPATIENT
Start: 2018-03-30 | End: 2018-04-06

## 2018-03-30 NOTE — PROGRESS NOTES
"Subjective:      Neeta Warren is a 62 y.o. male who presents with Oral Swelling (lower left side )            This is a new problem. Pt reports he has had several broken, decayed teeth on his lower jaw for at least a year. He recently developed an abscess on his lower left jaw. Reports pain and swelling for 4 days. Denies fever. States his insurance is active in 2 days and will establish with a dentist next week. Is taking ibuprofen for pain.        Review of Systems   HENT:        Dental infection   All other systems reviewed and are negative.    Past Medical History:   Diagnosis Date   • Heart murmur    • Kidney disease       Past Surgical History:   Procedure Laterality Date   • HAND SURGERY        Social History     Social History   • Marital status:      Spouse name: N/A   • Number of children: N/A   • Years of education: N/A     Occupational History   • Not on file.     Social History Main Topics   • Smoking status: Current Every Day Smoker     Packs/day: 1.00     Types: Cigarettes   • Smokeless tobacco: Never Used   • Alcohol use No   • Drug use: No   • Sexual activity: Not on file     Other Topics Concern   • Not on file     Social History Narrative   • No narrative on file          Objective:     /64   Pulse (!) 111   Temp 36.6 °C (97.8 °F)   Ht 1.753 m (5' 9\")   Wt 96.2 kg (212 lb)   SpO2 94%   BMI 31.31 kg/m²      Physical Exam   Constitutional: He is oriented to person, place, and time. Vital signs are normal. He appears well-developed and well-nourished.   HENT:   Head: Normocephalic and atraumatic.   Mouth/Throat: Dental abscesses and dental caries present.       Eyes: EOM are normal. Pupils are equal, round, and reactive to light.   Neck: Normal range of motion.   Cardiovascular: Normal rate and regular rhythm.    Pulmonary/Chest: Effort normal.   Musculoskeletal: Normal range of motion.   Neurological: He is alert and oriented to person, place, and time.   Skin: Skin is warm and " dry. Capillary refill takes less than 2 seconds.   Psychiatric: He has a normal mood and affect. His speech is normal and behavior is normal. Thought content normal.   Vitals reviewed.              Assessment/Plan:     1. Dental infection  - clindamycin (CLEOCIN) 300 MG Cap; Take 1 Cap by mouth 3 times a day for 7 days.  Dispense: 21 Cap; Refill: 0    Tylenol and ibuprofen PRN pain  See dentist next week  Strict ER precautions for new onset of fever, worsening swelling, trouble swallowing, trouble managing secretions  Supportive care, differential diagnoses, and indications for immediate follow-up discussed with patient.    Pathogenesis of diagnosis discussed including typical length and natural progression.      Instructed to return to  or nearest emergency department if symptoms fail to improve, for any change in condition, further concerns, or new concerning symptoms.  Patient states understanding of the plan of care and discharge instructions.

## 2018-05-15 ENCOUNTER — HOSPITAL ENCOUNTER (EMERGENCY)
Facility: MEDICAL CENTER | Age: 63
End: 2018-05-15
Attending: EMERGENCY MEDICINE
Payer: MEDICARE

## 2018-05-15 ENCOUNTER — APPOINTMENT (OUTPATIENT)
Dept: RADIOLOGY | Facility: MEDICAL CENTER | Age: 63
End: 2018-05-15
Attending: EMERGENCY MEDICINE
Payer: MEDICARE

## 2018-05-15 ENCOUNTER — APPOINTMENT (OUTPATIENT)
Dept: RADIOLOGY | Facility: MEDICAL CENTER | Age: 63
End: 2018-05-15
Payer: MEDICARE

## 2018-05-15 VITALS
TEMPERATURE: 98.4 F | HEIGHT: 69 IN | OXYGEN SATURATION: 94 % | SYSTOLIC BLOOD PRESSURE: 179 MMHG | DIASTOLIC BLOOD PRESSURE: 91 MMHG | RESPIRATION RATE: 12 BRPM | HEART RATE: 68 BPM

## 2018-05-15 DIAGNOSIS — R07.9 CHEST PAIN, UNSPECIFIED TYPE: Primary | ICD-10-CM

## 2018-05-15 DIAGNOSIS — F17.210 CIGARETTE SMOKER: ICD-10-CM

## 2018-05-15 DIAGNOSIS — R07.9 CHEST PAIN, UNSPECIFIED TYPE: ICD-10-CM

## 2018-05-15 LAB
ALBUMIN SERPL BCP-MCNC: 4.4 G/DL (ref 3.2–4.9)
ALBUMIN/GLOB SERPL: 1.8 G/DL
ALP SERPL-CCNC: 99 U/L (ref 30–99)
ALT SERPL-CCNC: 7 U/L (ref 2–50)
ANION GAP SERPL CALC-SCNC: 8 MMOL/L (ref 0–11.9)
APTT PPP: 33.7 SEC (ref 24.7–36)
AST SERPL-CCNC: 12 U/L (ref 12–45)
BASOPHILS # BLD AUTO: 1.1 % (ref 0–1.8)
BASOPHILS # BLD: 0.09 K/UL (ref 0–0.12)
BILIRUB SERPL-MCNC: 0.4 MG/DL (ref 0.1–1.5)
BNP SERPL-MCNC: 59 PG/ML (ref 0–100)
BUN SERPL-MCNC: 12 MG/DL (ref 8–22)
CALCIUM SERPL-MCNC: 9.3 MG/DL (ref 8.5–10.5)
CHLORIDE SERPL-SCNC: 106 MMOL/L (ref 96–112)
CO2 SERPL-SCNC: 25 MMOL/L (ref 20–33)
CREAT SERPL-MCNC: 0.97 MG/DL (ref 0.5–1.4)
EKG IMPRESSION: NORMAL
EOSINOPHIL # BLD AUTO: 0.25 K/UL (ref 0–0.51)
EOSINOPHIL NFR BLD: 2.9 % (ref 0–6.9)
ERYTHROCYTE [DISTWIDTH] IN BLOOD BY AUTOMATED COUNT: 41.1 FL (ref 35.9–50)
GLOBULIN SER CALC-MCNC: 2.5 G/DL (ref 1.9–3.5)
GLUCOSE SERPL-MCNC: 86 MG/DL (ref 65–99)
HCT VFR BLD AUTO: 46.3 % (ref 42–52)
HGB BLD-MCNC: 16.1 G/DL (ref 14–18)
IMM GRANULOCYTES # BLD AUTO: 0.02 K/UL (ref 0–0.11)
IMM GRANULOCYTES NFR BLD AUTO: 0.2 % (ref 0–0.9)
INR PPP: 1.01 (ref 0.87–1.13)
LIPASE SERPL-CCNC: 50 U/L (ref 11–82)
LYMPHOCYTES # BLD AUTO: 2.56 K/UL (ref 1–4.8)
LYMPHOCYTES NFR BLD: 30.2 % (ref 22–41)
MCH RBC QN AUTO: 31.1 PG (ref 27–33)
MCHC RBC AUTO-ENTMCNC: 34.8 G/DL (ref 33.7–35.3)
MCV RBC AUTO: 89.4 FL (ref 81.4–97.8)
MONOCYTES # BLD AUTO: 0.69 K/UL (ref 0–0.85)
MONOCYTES NFR BLD AUTO: 8.1 % (ref 0–13.4)
NEUTROPHILS # BLD AUTO: 4.87 K/UL (ref 1.82–7.42)
NEUTROPHILS NFR BLD: 57.5 % (ref 44–72)
NRBC # BLD AUTO: 0 K/UL
NRBC BLD-RTO: 0 /100 WBC
PLATELET # BLD AUTO: 241 K/UL (ref 164–446)
PMV BLD AUTO: 10.1 FL (ref 9–12.9)
POTASSIUM SERPL-SCNC: 3.9 MMOL/L (ref 3.6–5.5)
PROT SERPL-MCNC: 6.9 G/DL (ref 6–8.2)
PROTHROMBIN TIME: 13 SEC (ref 12–14.6)
RBC # BLD AUTO: 5.18 M/UL (ref 4.7–6.1)
SODIUM SERPL-SCNC: 139 MMOL/L (ref 135–145)
TROPONIN I SERPL-MCNC: <0.01 NG/ML (ref 0–0.04)
WBC # BLD AUTO: 8.5 K/UL (ref 4.8–10.8)

## 2018-05-15 PROCEDURE — 84484 ASSAY OF TROPONIN QUANT: CPT

## 2018-05-15 PROCEDURE — 83690 ASSAY OF LIPASE: CPT

## 2018-05-15 PROCEDURE — 71045 X-RAY EXAM CHEST 1 VIEW: CPT

## 2018-05-15 PROCEDURE — 80053 COMPREHEN METABOLIC PANEL: CPT

## 2018-05-15 PROCEDURE — 93005 ELECTROCARDIOGRAM TRACING: CPT | Performed by: EMERGENCY MEDICINE

## 2018-05-15 PROCEDURE — 99284 EMERGENCY DEPT VISIT MOD MDM: CPT

## 2018-05-15 PROCEDURE — 83880 ASSAY OF NATRIURETIC PEPTIDE: CPT

## 2018-05-15 PROCEDURE — 85610 PROTHROMBIN TIME: CPT

## 2018-05-15 PROCEDURE — 85025 COMPLETE CBC W/AUTO DIFF WBC: CPT

## 2018-05-15 PROCEDURE — 93005 ELECTROCARDIOGRAM TRACING: CPT

## 2018-05-15 PROCEDURE — 36415 COLL VENOUS BLD VENIPUNCTURE: CPT

## 2018-05-15 PROCEDURE — 85730 THROMBOPLASTIN TIME PARTIAL: CPT

## 2018-05-15 RX ORDER — PANTOPRAZOLE SODIUM 40 MG/1
40 TABLET, DELAYED RELEASE ORAL DAILY
COMMUNITY
End: 2019-03-03

## 2018-05-15 ASSESSMENT — PAIN SCALES - GENERAL
PAINLEVEL_OUTOF10: 5
PAINLEVEL_OUTOF10: 4

## 2018-05-15 ASSESSMENT — LIFESTYLE VARIABLES: DO YOU DRINK ALCOHOL: NO

## 2018-05-15 NOTE — ED NOTES
Neeta Warren discharged via ambulatory with self.  Discharge instructions given and reviewed, patient educated to follow up with cardiology and stress test which was scheduled, verbalized understanding.  Prescriptions given x 0.  All personal belongings in possession.  No questions at this time.

## 2018-05-15 NOTE — ED TRIAGE NOTES
Pt comes in complaining of left sided chest pain for approx 1 week. Pt stating intermittent pain between his shoulder blades. Pt also reporting R eye pain and a foreign body to eye approx 1 week ago.

## 2018-05-15 NOTE — ED NOTES
Neeta Warren patient has chosen to leave the hospital against medical advice. The attending physician has not discharged the patient. Patient is not a risk to himself or others. I have discussed with the patient the following:  Physician has not determined patient is ready for discharge, Risks and consequences of leaving the hospital too soon and Benefit of continued hospitalization.      Discharge against medical advice form has been Signed.      Attending physician has been notified.

## 2018-05-15 NOTE — ED PROVIDER NOTES
"ED Provider Note  CHIEF COMPLAINT  Chief Complaint   Patient presents with   • Chest Pain       HPI  Neeta Warren is a 62 y.o. male who presents complaining of some intermittent mild to moderate left anterior chest pain over the past several days. He is a cigarette smoker. He had a cardiac stress test about 18 months ago. It was normal. No diabetes no hypertension. He states that he will not stay overnight for a stress test. He would like to go home. She'll go out for a outpatient stress test. He understands versus associated with leaving against advice. No fever chills or cough. No abdominal pain.    REVIEW OF SYSTEMS  No headache no dropping, no difficulty breathing. No leg pain or swelling.  ALL OTHER SYSTEMS NEGATIVE    ALLERGIES  Allergies   Allergen Reactions   • Bee    • Celebrex [Celecoxib]    • Cortisone        CURRENT MEDICATIONS  No current medication    PAST MEDICAL HISTORY  Past Medical History:   Diagnosis Date   • Heart murmur    • Kidney disease        SURGICAL HISTORY  Past Surgical History:   Procedure Laterality Date   • HAND SURGERY         FAMILY HISTORY  Family History   Problem Relation Age of Onset   • Diabetes Father    • Heart Disease Maternal Grandmother    • Heart Disease Maternal Grandfather        SOCIAL HISTORY  Cigarette smoker    PHYSICAL EXAM  GENERAL: Alert male adult  VITAL SIGNS: BP (!) 179/91   Pulse 95   Temp 36.9 °C (98.4 °F)   Resp 16   Ht 1.753 m (5' 9\")   SpO2 97%    Constitutional: Alert healthy-appearing adult HENT: Scalp is normal size and nontender. Ears are clear. Nose is clear. Throat is clear with no stridor no drooling no trismus. Teeth are all intact.  Eyes: Pupils equal round and reactive to light, extraocular motor fall. There is no scleral icterus.  Neck: Neck is supple and nontender. There is no meningismus. No adenitis. No thyromegaly.  Lymphatic: No adenopathy.   Cardiovascular: Heart regular rhythm without murmurs or gallops   Thorax & Lungs: No chest " wall tenderness. Lungs are clear. Patient has good breath sounds bilateral. No rales, no rhonchi, no wheezes.  Abdomen: Abdomen is soft, nontender, not rigid, no guarding, and no organomegaly. There is no palpable hernia   Skin: Warm, pink, and dry with no erythema and no rash.   Back: Nontender, no midline bony tenderness, no flank tenderness.  Extremities: Full range of motion  No tenderness to palpation and no deformities noted. No calf or thigh swelling. No calf or thigh tenderness. No clinical DVT.  Neurologic: Alert & oriented . Cranial nerves are grossly intact as tested. Patient moves all 4 extremities well. Patient has good strong flexion and extension of the ankle joints knee joints hip joints and elbow joints. Sensation is normal and symmetrical in the upper and lower extremities.   Psychiatric: Patient is alert oriented coherent and rational.     EKG  EKG Interpretation    Interpreted by me    Rhythm: normal sinus   Rate: normal  Axis: Left axis deviation  Ectopy: none  Conduction: normal  ST Segments: no acute change  T Waves: no acute change  Q Waves: none    Clinical Impression: L sinus rhythm, left axis deviation, no specific ST-T wave change.    RADIOLOGY/PROCEDURES  DX-CHEST-LIMITED (1 VIEW)   Final Result         Stable chest x-ray findings as described above.            COURSE & MEDICAL DECISION MAKING  Patient presents with some intermittent moderately severe left anterior chest pain over the past few days. He had a cardiac stress test which was apparently normal about 18 months ago. He does smoke cigarettes and has for many years. No diabetes or hypertension. Differential diagnosis: Left anterior chest pain, acute coronary syndrome, unstable angina, esophageal spasm, pulmonary problem, etc.    Plan: #1 IV #2 cardiac monitor, pulse ox monitor, blood pressure monitor. #3. Aspirin by mouth #4. Nasal O2 #5. Laboratory evaluation including CBC, CMP, troponin, ProTime, chest x-ray and EKG. #6. The  patient is stating that he will not be admitted to the hospital. He understands the risks associated with leaving against my. If that's the case he will sign an AMA form. He is agreed to sign the AMA form.    Laboratory and reexamination: EKG is normal. Chest x-ray is stable. Troponin is negative. BNP is normal. CBC is normal. Chemistry panel is normal.    Results for orders placed or performed during the hospital encounter of 05/15/18   Troponin   Result Value Ref Range    Troponin I <0.01 0.00 - 0.04 ng/mL   Btype Natriuretic Peptide   Result Value Ref Range    B Natriuretic Peptide 59 0 - 100 pg/mL   CBC with Differential   Result Value Ref Range    WBC 8.5 4.8 - 10.8 K/uL    RBC 5.18 4.70 - 6.10 M/uL    Hemoglobin 16.1 14.0 - 18.0 g/dL    Hematocrit 46.3 42.0 - 52.0 %    MCV 89.4 81.4 - 97.8 fL    MCH 31.1 27.0 - 33.0 pg    MCHC 34.8 33.7 - 35.3 g/dL    RDW 41.1 35.9 - 50.0 fL    Platelet Count 241 164 - 446 K/uL    MPV 10.1 9.0 - 12.9 fL    Neutrophils-Polys 57.50 44.00 - 72.00 %    Lymphocytes 30.20 22.00 - 41.00 %    Monocytes 8.10 0.00 - 13.40 %    Eosinophils 2.90 0.00 - 6.90 %    Basophils 1.10 0.00 - 1.80 %    Immature Granulocytes 0.20 0.00 - 0.90 %    Nucleated RBC 0.00 /100 WBC    Neutrophils (Absolute) 4.87 1.82 - 7.42 K/uL    Lymphs (Absolute) 2.56 1.00 - 4.80 K/uL    Monos (Absolute) 0.69 0.00 - 0.85 K/uL    Eos (Absolute) 0.25 0.00 - 0.51 K/uL    Baso (Absolute) 0.09 0.00 - 0.12 K/uL    Immature Granulocytes (abs) 0.02 0.00 - 0.11 K/uL    NRBC (Absolute) 0.00 K/uL   Complete Metabolic Panel (CMP)   Result Value Ref Range    Sodium 139 135 - 145 mmol/L    Potassium 3.9 3.6 - 5.5 mmol/L    Chloride 106 96 - 112 mmol/L    Co2 25 20 - 33 mmol/L    Anion Gap 8.0 0.0 - 11.9    Glucose 86 65 - 99 mg/dL    Bun 12 8 - 22 mg/dL    Creatinine 0.97 0.50 - 1.40 mg/dL    Calcium 9.3 8.5 - 10.5 mg/dL    AST(SGOT) 12 12 - 45 U/L    ALT(SGPT) 7 2 - 50 U/L    Alkaline Phosphatase 99 30 - 99 U/L    Total Bilirubin  0.4 0.1 - 1.5 mg/dL    Albumin 4.4 3.2 - 4.9 g/dL    Total Protein 6.9 6.0 - 8.2 g/dL    Globulin 2.5 1.9 - 3.5 g/dL    A-G Ratio 1.8 g/dL   Prothrombin Time   Result Value Ref Range    PT 13.0 12.0 - 14.6 sec    INR 1.01 0.87 - 1.13   APTT   Result Value Ref Range    APTT 33.7 24.7 - 36.0 sec   Lipase   Result Value Ref Range    Lipase 50 11 - 82 U/L   ESTIMATED GFR   Result Value Ref Range    GFR If African American >60 >60 mL/min/1.73 m 2    GFR If Non African American >60 >60 mL/min/1.73 m 2   EKG (ER)   Result Value Ref Range    Report       [facility] Emergency Dept.    Test Date:  2018-05-15  Pt Name:    ELENA CURRY                 Department: ER  MRN:        [id number]                      Room:  Gender:     Male                         Technician: [id number]  :        [id number]                   Requested By:ER TRIAGE PROTOCOL  Order #:    [id number]                    Reading MD: GARY GANSERT, MD    Measurements  Intervals                                Axis  Rate:       81                           P:          38  AK:         180                          QRS:        -16  QRSD:       104                          T:          27  QT:         352  QTc:        409    Interpretive Statements  SINUS RHYTHM  INCOMPLETE RBBB AND LAFB  CONSIDER RIGHT VENTRICULAR HYPERTROPHY  ARTIFACT IN LEAD(S) I,III,aVL,V1,V2  Compared to ECG 2017 23:29:34  No significant changes    Electronically Signed On 5- 14:37:38 PDT by GARY GANSERT, MD     Patient refuses admission to the hospital for chest pain evaluation. He understands the risks associated with leaving against advice. He understands having chest pain of possible heart attack could result in respiratory failure, heart failure, brain damage and death. He except those risks. He signed the AMA form. He'll follow up with a cardiologist. He'll return as needed.    Home treatment: #1 is given a copy of all of his reports #2 he'll follow up with  cardiologist for an outpatient stress test. #3. Outpatient stress test has been arranged. 4 return to the ED as needed.    FINAL IMPRESSION  1. Chest pain  2. Cigarette smoker         Electronically signed by: Gary Gansert, 5/15/2018 4 PM PM

## 2018-05-15 NOTE — ED NOTES
Attempting schedule follow up for patient.  Waiting for  to call back once appt is made.  AMA form filled out by Dr. Gansert

## 2018-05-17 ENCOUNTER — HOSPITAL ENCOUNTER (OUTPATIENT)
Dept: RADIOLOGY | Facility: MEDICAL CENTER | Age: 63
End: 2018-05-17
Attending: EMERGENCY MEDICINE
Payer: MEDICARE

## 2018-05-17 DIAGNOSIS — R07.9 CHEST PAIN, UNSPECIFIED TYPE: ICD-10-CM

## 2018-05-17 PROCEDURE — 700111 HCHG RX REV CODE 636 W/ 250 OVERRIDE (IP)

## 2018-05-17 PROCEDURE — A9502 TC99M TETROFOSMIN: HCPCS

## 2018-05-17 RX ORDER — REGADENOSON 0.08 MG/ML
INJECTION, SOLUTION INTRAVENOUS
Status: COMPLETED
Start: 2018-05-17 | End: 2018-05-17

## 2018-05-17 RX ADMIN — REGADENOSON 0.4 MG: 0.08 INJECTION, SOLUTION INTRAVENOUS at 09:49

## 2019-03-03 ENCOUNTER — HOSPITAL ENCOUNTER (EMERGENCY)
Facility: MEDICAL CENTER | Age: 64
End: 2019-03-04
Attending: EMERGENCY MEDICINE
Payer: MEDICARE

## 2019-03-03 DIAGNOSIS — R07.89 CHEST WALL PAIN: ICD-10-CM

## 2019-03-03 DIAGNOSIS — J06.9 VIRAL URI WITH COUGH: ICD-10-CM

## 2019-03-03 PROCEDURE — 94760 N-INVAS EAR/PLS OXIMETRY 1: CPT

## 2019-03-03 PROCEDURE — 99284 EMERGENCY DEPT VISIT MOD MDM: CPT

## 2019-03-03 PROCEDURE — 93005 ELECTROCARDIOGRAM TRACING: CPT | Performed by: EMERGENCY MEDICINE

## 2019-03-03 RX ORDER — HYDROCODONE BITARTRATE AND ACETAMINOPHEN 7.5; 325 MG/1; MG/1
1-2 TABLET ORAL DAILY
COMMUNITY

## 2019-03-03 ASSESSMENT — PAIN DESCRIPTION - DESCRIPTORS: DESCRIPTORS: SHARP

## 2019-03-04 ENCOUNTER — APPOINTMENT (OUTPATIENT)
Dept: RADIOLOGY | Facility: MEDICAL CENTER | Age: 64
End: 2019-03-04
Attending: EMERGENCY MEDICINE
Payer: MEDICARE

## 2019-03-04 VITALS
OXYGEN SATURATION: 92 % | RESPIRATION RATE: 17 BRPM | DIASTOLIC BLOOD PRESSURE: 110 MMHG | HEART RATE: 109 BPM | TEMPERATURE: 96.8 F | BODY MASS INDEX: 32.56 KG/M2 | SYSTOLIC BLOOD PRESSURE: 187 MMHG | WEIGHT: 202.6 LBS | HEIGHT: 66 IN

## 2019-03-04 LAB
ALBUMIN SERPL BCP-MCNC: 4.6 G/DL (ref 3.2–4.9)
ALBUMIN/GLOB SERPL: 1.6 G/DL
ALP SERPL-CCNC: 100 U/L (ref 30–99)
ALT SERPL-CCNC: 10 U/L (ref 2–50)
ANION GAP SERPL CALC-SCNC: 7 MMOL/L (ref 0–11.9)
AST SERPL-CCNC: 14 U/L (ref 12–45)
BASOPHILS # BLD AUTO: 0.9 % (ref 0–1.8)
BASOPHILS # BLD: 0.11 K/UL (ref 0–0.12)
BILIRUB SERPL-MCNC: 0.4 MG/DL (ref 0.1–1.5)
BUN SERPL-MCNC: 12 MG/DL (ref 8–22)
CALCIUM SERPL-MCNC: 10 MG/DL (ref 8.5–10.5)
CHLORIDE SERPL-SCNC: 106 MMOL/L (ref 96–112)
CO2 SERPL-SCNC: 25 MMOL/L (ref 20–33)
CREAT SERPL-MCNC: 0.95 MG/DL (ref 0.5–1.4)
D DIMER PPP IA.FEU-MCNC: 0.73 UG/ML (FEU) (ref 0–0.5)
EKG IMPRESSION: NORMAL
EOSINOPHIL # BLD AUTO: 0.57 K/UL (ref 0–0.51)
EOSINOPHIL NFR BLD: 4.4 % (ref 0–6.9)
ERYTHROCYTE [DISTWIDTH] IN BLOOD BY AUTOMATED COUNT: 44.5 FL (ref 35.9–50)
GLOBULIN SER CALC-MCNC: 2.9 G/DL (ref 1.9–3.5)
GLUCOSE SERPL-MCNC: 115 MG/DL (ref 65–99)
HCT VFR BLD AUTO: 47.8 % (ref 42–52)
HGB BLD-MCNC: 16.3 G/DL (ref 14–18)
IMM GRANULOCYTES # BLD AUTO: 0.04 K/UL (ref 0–0.11)
IMM GRANULOCYTES NFR BLD AUTO: 0.3 % (ref 0–0.9)
LIPASE SERPL-CCNC: 45 U/L (ref 11–82)
LYMPHOCYTES # BLD AUTO: 3.37 K/UL (ref 1–4.8)
LYMPHOCYTES NFR BLD: 26 % (ref 22–41)
MCH RBC QN AUTO: 31.2 PG (ref 27–33)
MCHC RBC AUTO-ENTMCNC: 34.1 G/DL (ref 33.7–35.3)
MCV RBC AUTO: 91.6 FL (ref 81.4–97.8)
MONOCYTES # BLD AUTO: 1.03 K/UL (ref 0–0.85)
MONOCYTES NFR BLD AUTO: 8 % (ref 0–13.4)
NEUTROPHILS # BLD AUTO: 7.82 K/UL (ref 1.82–7.42)
NEUTROPHILS NFR BLD: 60.4 % (ref 44–72)
NRBC # BLD AUTO: 0 K/UL
NRBC BLD-RTO: 0 /100 WBC
PLATELET # BLD AUTO: 245 K/UL (ref 164–446)
PMV BLD AUTO: 10.8 FL (ref 9–12.9)
POTASSIUM SERPL-SCNC: 3.4 MMOL/L (ref 3.6–5.5)
PROT SERPL-MCNC: 7.5 G/DL (ref 6–8.2)
RBC # BLD AUTO: 5.22 M/UL (ref 4.7–6.1)
SODIUM SERPL-SCNC: 138 MMOL/L (ref 135–145)
TROPONIN I SERPL-MCNC: <0.01 NG/ML (ref 0–0.04)
WBC # BLD AUTO: 12.9 K/UL (ref 4.8–10.8)

## 2019-03-04 PROCEDURE — 85025 COMPLETE CBC W/AUTO DIFF WBC: CPT

## 2019-03-04 PROCEDURE — 84484 ASSAY OF TROPONIN QUANT: CPT

## 2019-03-04 PROCEDURE — 71046 X-RAY EXAM CHEST 2 VIEWS: CPT

## 2019-03-04 PROCEDURE — 71275 CT ANGIOGRAPHY CHEST: CPT

## 2019-03-04 PROCEDURE — 83690 ASSAY OF LIPASE: CPT

## 2019-03-04 PROCEDURE — 700117 HCHG RX CONTRAST REV CODE 255: Performed by: EMERGENCY MEDICINE

## 2019-03-04 PROCEDURE — 80053 COMPREHEN METABOLIC PANEL: CPT

## 2019-03-04 PROCEDURE — 700101 HCHG RX REV CODE 250: Performed by: EMERGENCY MEDICINE

## 2019-03-04 PROCEDURE — 85379 FIBRIN DEGRADATION QUANT: CPT

## 2019-03-04 PROCEDURE — 94640 AIRWAY INHALATION TREATMENT: CPT

## 2019-03-04 RX ADMIN — IOHEXOL 100 ML: 350 INJECTION, SOLUTION INTRAVENOUS at 01:26

## 2019-03-04 RX ADMIN — ALBUTEROL SULFATE 2.5 MG: 2.5 SOLUTION RESPIRATORY (INHALATION) at 02:14

## 2019-03-04 NOTE — ED NOTES
Assumed care of pt at this time. Pt resting in bed, denies needs. ABCs intact. NAD noted at this time.

## 2019-03-04 NOTE — ED NOTES
Discharge instructions provided to pt at this time, verbalized understanding. Pt ambulatory to lobby without difficulty, steady gait noted. NAD noted.

## 2019-03-04 NOTE — ED TRIAGE NOTES
Neeta Warren  Chief Complaint   Patient presents with   • Rib Pain     left side,  started this am,  sharp,  worse when laying flat.      Pt ambulatory to triage with above complaint.  Elevated BP and HR, but no acute distress noted.  Pt states saw PCP on Wednesday and started on albuterol inhaler and nose spray for cold like symptoms,  Pain to left rib started this am.   Pt returned to [facility], educated on triage process, and to inform staff of any changes or concerns.

## 2019-03-04 NOTE — DISCHARGE INSTRUCTIONS
Return if you have new or different chest pain, cough up blood, productive cough , or fever. You had an adrenal nodule that they recommended an MRI to further evaluate this please have your primary follow up on this.

## 2019-03-04 NOTE — ED PROVIDER NOTES
"ED Provider Note    Scribed for Jorge Abdi M.D. by Miguel Wren. 3/4/2019, 12:04 AM.    Primary care provider: Pcp Pt States None  Means of arrival: Walk In  History obtained from: Patient  History limited by: None    CHIEF COMPLAINT  Chief Complaint   Patient presents with   • Rib Pain     left side,  started this am,  sharp,  worse when laying flat.        JYOTSNA Warren is a 63 y.o. male who presents to the Emergency Department for evaluation of constant left sided rib pain onset about one hour ago. The patient describes the pain as feeling \"sharp\" noting that it was exacerbated when laying down. He endorses experiencing associated pain underneath his left anterior ribs. His pain is not exacerbated with inspiration. He states that he is not currently in pain as it alleviated on its own after several minutes. Per patient, he has also been experiencing intermittent nonproductive cough, congestion, shortness of breath, and a fever. He went to his primary care provider 3 days ago who gave him a breathing treatment which provided some alleviation of his symptoms. He does not have a history of any heart problems or blood clots. He denies any recent trauma, pain in one leg, leg swelling, and recent travel.     REVIEW OF SYSTEMS  Pertinent positives include left sided rib pain, pain underneath left anterior ribs, nonproductive cough, congestion, shortness of breath, or fever. Pertinent negatives include no recent trauma, pain in one leg, leg swelling, or recent travel. All other systems negative.    PAST MEDICAL HISTORY   has a past medical history of Heart murmur and Kidney disease.    SURGICAL HISTORY   has a past surgical history that includes hand surgery; elbow arthroscopy; and other.    SOCIAL HISTORY  Social History   Substance Use Topics   • Smoking status: Current Every Day Smoker     Packs/day: 1.00     Types: Cigarettes   • Smokeless tobacco: Never Used   • Alcohol use No    " "  History   Drug Use No       FAMILY HISTORY  Family History   Problem Relation Age of Onset   • Diabetes Father    • Heart Disease Maternal Grandmother    • Heart Disease Maternal Grandfather        CURRENT MEDICATIONS  Home Medications     Reviewed by Sherron Cameron R.N. (Registered Nurse) on 03/03/19 at 2330  Med List Status: Partial   Medication Last Dose Status   HYDROcodone-acetaminophen (NORCO) 7.5-325 MG per tablet 3/3/2019 Active   ibuprofen (MOTRIN) 800 MG Tab  Active                ALLERGIES  Allergies   Allergen Reactions   • Bee    • Celebrex [Celecoxib]    • Cortisone        PHYSICAL EXAM  VITAL SIGNS: BP (!) 187/110   Pulse (!) 114   Temp 36 °C (96.8 °F) (Temporal)   Resp 16   Ht 1.676 m (5' 6\")   Wt 91.9 kg (202 lb 9.6 oz)   SpO2 96%   BMI 32.70 kg/m²     Constitutional: Well developed, Well nourished, mild distress.   HENT: Normocephalic, Atraumatic,  Eyes: Conjunctiva normal, No discharge.   Cardiovascular: Normal heart rate, Normal rhythm, No murmurs, equal pulses.   Pulmonary: Rhonchi and wheezing in left base. No rales.  Chest: Nonreproducible chest pain. No step off or crepitus.   Abdomen:Soft, No tenderness, No masses, no rebound, no guarding.   Back: No CVA tenderness.   Musculoskeletal: No major deformities noted, No tenderness. No edema. No calf pain or tenderness. Calves appear symmetric.   Skin: Warm, Dry, No erythema, No rash.   Neurologic: Alert & oriented x 3, Normal motor function,  No focal deficits noted.   Psychiatric: Affect normal, Judgment normal, Mood normal.     LABS   Results for orders placed or performed during the hospital encounter of 03/03/19   CBC w/ Differential   Result Value Ref Range    WBC 12.9 (H) 4.8 - 10.8 K/uL    RBC 5.22 4.70 - 6.10 M/uL    Hemoglobin 16.3 14.0 - 18.0 g/dL    Hematocrit 47.8 42.0 - 52.0 %    MCV 91.6 81.4 - 97.8 fL    MCH 31.2 27.0 - 33.0 pg    MCHC 34.1 33.7 - 35.3 g/dL    RDW 44.5 35.9 - 50.0 fL    Platelet Count 245 164 - 446 " K/uL    MPV 10.8 9.0 - 12.9 fL    Neutrophils-Polys 60.40 44.00 - 72.00 %    Lymphocytes 26.00 22.00 - 41.00 %    Monocytes 8.00 0.00 - 13.40 %    Eosinophils 4.40 0.00 - 6.90 %    Basophils 0.90 0.00 - 1.80 %    Immature Granulocytes 0.30 0.00 - 0.90 %    Nucleated RBC 0.00 /100 WBC    Neutrophils (Absolute) 7.82 (H) 1.82 - 7.42 K/uL    Lymphs (Absolute) 3.37 1.00 - 4.80 K/uL    Monos (Absolute) 1.03 (H) 0.00 - 0.85 K/uL    Eos (Absolute) 0.57 (H) 0.00 - 0.51 K/uL    Baso (Absolute) 0.11 0.00 - 0.12 K/uL    Immature Granulocytes (abs) 0.04 0.00 - 0.11 K/uL    NRBC (Absolute) 0.00 K/uL   Complete Metabolic Panel (CMP)   Result Value Ref Range    Sodium 138 135 - 145 mmol/L    Potassium 3.4 (L) 3.6 - 5.5 mmol/L    Chloride 106 96 - 112 mmol/L    Co2 25 20 - 33 mmol/L    Anion Gap 7.0 0.0 - 11.9    Glucose 115 (H) 65 - 99 mg/dL    Bun 12 8 - 22 mg/dL    Creatinine 0.95 0.50 - 1.40 mg/dL    Calcium 10.0 8.5 - 10.5 mg/dL    AST(SGOT) 14 12 - 45 U/L    ALT(SGPT) 10 2 - 50 U/L    Alkaline Phosphatase 100 (H) 30 - 99 U/L    Total Bilirubin 0.4 0.1 - 1.5 mg/dL    Albumin 4.6 3.2 - 4.9 g/dL    Total Protein 7.5 6.0 - 8.2 g/dL    Globulin 2.9 1.9 - 3.5 g/dL    A-G Ratio 1.6 g/dL   Troponin STAT   Result Value Ref Range    Troponin I <0.01 0.00 - 0.04 ng/mL   Lipase   Result Value Ref Range    Lipase 45 11 - 82 U/L   D-Dimer (only helpful in low pre-test probability wells critieria. Do not order if patient ruled out by PERC criteria. See Weblinks at top of Labs section)   Result Value Ref Range    D-Dimer Screen 0.73 (H) 0.00 - 0.50 ug/mL (FEU)   ESTIMATED GFR   Result Value Ref Range    GFR If African American >60 >60 mL/min/1.73 m 2    GFR If Non African American >60 >60 mL/min/1.73 m 2   EKG   Result Value Ref Range    Report       [facility] Emergency Dept.    Test Date:  2019-03-03  Pt Name:    ELENA CURRY                 Department: ER  MRN:        [id number]                      Room:  Gender:     Male                          Technician: [id number]  :        1955                   Requested By:ER TRIAGE PROTOCOL  Order #:    [id number]                    Reading MD:    Measurements  Intervals                                Axis  Rate:       105                          P:          41  DE:         188                          QRS:        74  QRSD:       106                          T:          43  QT:         344  QTc:        455    Interpretive Statements  SINUS TACHYCARDIA  PROBABLE LEFT ATRIAL ABNORMALITY  LEFT ANTERIOR FASCICULAR BLOCK  LATE PRECORDIAL R/S TRANSITION  BASELINE WANDER IN LEAD(S) V2  Compared to ECG 05/15/2018 11:48:18  Sinus rhythm no longer present  Incomplete right bundle-branch block no longer present  Right bundle-branch block no longer present         All labs reviewed by me.    EKG  12 Lead EKG interpreted by me shows sinus tachycardia at a rate of 105. Leftward Axis. Left Anterior Fascicular Block. No ST elevations. No T wave inversions. DE interval 188.  QTc 455.  Old EKG from 5/15/18 shows that the patient is now tachycardic and the incomplete right bundle branch block is less prominent, no acute changes. Final impression: Sinus Tachycardia with a left anterior fascicular block.    RADIOLOGY  CT-CTA CHEST PULMONARY ARTERY W/ RECONS   Final Result         1.  No pulmonary embolus appreciated.   2.  Indeterminate left adrenal nodule, recommend follow-up adrenal protocol CT or MRI as clinically appropriate for further characterization.   3.  Right-sided aortic arch, forms vascular sling around the esophagus. Correlate with history of dysphasia   4.  Small left pleural effusion and associated atelectasis   5.  Stable nodular thickening of the pleura of the right upper lobe.   6.  Diverticulosis      DX-CHEST-2 VIEWS   Final Result         1.  Hazy left pulmonary infiltrates, stable since prior, could represent recurrent or chronic infiltrate.   2.  Loculated appearing small left pleural  effusion        The radiologist's interpretation of all radiological studies have been reviewed by me.    COURSE & MEDICAL DECISION MAKING  Pertinent Labs & Imaging studies reviewed. (See chart for details)    12:04 AM - Patient seen and examined at bedside. Patient will be treated with Albuterol 2.5 mg. Ordered Dx-Chest, Estimated GFR, CBC, CMP, Troponin STAT, Lipase, D-Dimer, and EKG to evaluate his symptoms. The differential diagnoses include but are not limited to: Pneumonia, PE, atypical chest pain, and musculoskeletal pain. I informed the patient that we will do extensive blood work and imaging for evaluation of his symptoms. I also told him that we will do a breathing treatment here in the ED to help improve his lung sounds. The patient understood and agreed to the plan of care.     1:06 AM Ordered CT-CTA Chest Pulmonary Artery.      Patient feels much better.  Heart rate is come down.  CT of the chest is negative for pulmonary embolism or pneumonia.  Patient has a heart score of 2 and I do not think this is cardiac in nature I think this is more likely chest wall strain secondary to patient's coughing.  At this point time will have them follow-up with her primary physician.    Medical Decision Making: At this point time I think the patient's sharp chest pain that was pleuritic in nature is likely secondary to chest wall strain with cough.  Patient is no longer having any chest pain at all.  CT was done to rule out pulmonary embolism because of positive d-dimer and this is negative for PE or pneumonia.  Patient was made aware of adrenal nodules and need for follow-up.  Patient does not have any obvious pneumonia on CT I do not think antibiotics are warranted at this point time.  I suspect his cough is all secondary to viral syndrome.    The patient will return for new or worsening symptoms and is stable at the time of discharge.    The patient is referred to a primary physician for blood pressure management,  diabetic screening, and for all other preventative health concerns.    DISPOSITION:  Patient will be discharged home in stable condition.    FOLLOW UP:  ANNE GrayRJettN.  [address]  [facility] [address]  [phone]    Schedule an appointment as soon as possible for a visit in 2 days        OUTPATIENT MEDICATIONS:  New Prescriptions    No medications on file          FINAL IMPRESSION  1. Chest wall pain    2. Viral URI with cough          Miguel CARTER (Rodolfoibrosalva), am scribing for, and in the presence of, Jorge Abdi M.D.    Electronically signed by: Miguel Wren (Scribrosalva), 3/4/2019    Jorge CARTER M.D. personally performed the services described in this documentation, as scribed by Miguel Wren in my presence, and it is both accurate and complete. C.     The note accurately reflects work and decisions made by me.  Jorge Abdi  3/4/2019  2:50 AM

## 2019-05-16 ENCOUNTER — OFFICE VISIT (OUTPATIENT)
Dept: URGENT CARE | Facility: PHYSICIAN GROUP | Age: 64
End: 2019-05-16
Payer: MEDICARE

## 2019-05-16 VITALS
DIASTOLIC BLOOD PRESSURE: 88 MMHG | BODY MASS INDEX: 32.14 KG/M2 | OXYGEN SATURATION: 96 % | TEMPERATURE: 97.8 F | RESPIRATION RATE: 16 BRPM | SYSTOLIC BLOOD PRESSURE: 124 MMHG | HEART RATE: 90 BPM | HEIGHT: 66 IN | WEIGHT: 200 LBS

## 2019-05-16 DIAGNOSIS — K04.7 DENTAL INFECTION: ICD-10-CM

## 2019-05-16 PROCEDURE — 99213 OFFICE O/P EST LOW 20 MIN: CPT | Performed by: FAMILY MEDICINE

## 2019-05-16 RX ORDER — AMOXICILLIN 875 MG/1
875 TABLET, COATED ORAL 2 TIMES DAILY
Qty: 20 TAB | Refills: 0 | Status: SHIPPED | OUTPATIENT
Start: 2019-05-16 | End: 2019-05-26

## 2019-05-16 ASSESSMENT — ENCOUNTER SYMPTOMS
EYE REDNESS: 0
EYE DISCHARGE: 0
FOCAL WEAKNESS: 0
SENSORY CHANGE: 0
WEIGHT LOSS: 0

## 2019-05-16 NOTE — PROGRESS NOTES
"Subjective:      Neeta Warren is a 63 y.o. male who presents with Oral Swelling (lower bottom teeth swelling painful, possible infection)            Recurrent dental infection has been worse over the last 2 to 3 days anterior mandible.  Gingiva swelling.  No buccal swelling.  No drainage.  No trismus.  No fever.  He has multiple caries and fractured teeth.  He is currently searching for a dentist/oral surgeon to handle this.  Pain waxes and wanes.  Currently 5/10.  No other aggravating or alleviating factors.        Review of Systems   Constitutional: Negative for malaise/fatigue and weight loss.   Eyes: Negative for discharge and redness.   Skin: Negative for itching and rash.   Neurological: Negative for sensory change and focal weakness.     .  Medications, Allergies, and current problem list reviewed today in Epic       Objective:     /88 (BP Location: Right arm, Patient Position: Sitting, BP Cuff Size: Large adult)   Pulse 90   Temp 36.6 °C (97.8 °F) (Temporal)   Resp 16   Ht 1.676 m (5' 6\")   Wt 90.7 kg (200 lb)   SpO2 96%   BMI 32.28 kg/m²      Physical Exam   Constitutional: He appears well-developed and well-nourished. No distress.   HENT:   Head: Normocephalic and atraumatic.   Mouth/Throat:                   Assessment/Plan:     1. Dental infection    - amoxicillin (AMOXIL) 875 MG tablet; Take 1 Tab by mouth 2 times a day for 10 days.  Dispense: 20 Tab; Refill: 0    Follow-up with neurosurgeon if possible.    "